# Patient Record
Sex: MALE | Race: WHITE | NOT HISPANIC OR LATINO
[De-identification: names, ages, dates, MRNs, and addresses within clinical notes are randomized per-mention and may not be internally consistent; named-entity substitution may affect disease eponyms.]

---

## 2022-02-16 ENCOUNTER — APPOINTMENT (OUTPATIENT)
Dept: CARDIOLOGY | Facility: CLINIC | Age: 72
End: 2022-02-16
Payer: COMMERCIAL

## 2022-02-16 ENCOUNTER — NON-APPOINTMENT (OUTPATIENT)
Age: 72
End: 2022-02-16

## 2022-02-16 VITALS
HEIGHT: 68 IN | RESPIRATION RATE: 17 BRPM | BODY MASS INDEX: 35.77 KG/M2 | SYSTOLIC BLOOD PRESSURE: 139 MMHG | OXYGEN SATURATION: 100 % | HEART RATE: 68 BPM | DIASTOLIC BLOOD PRESSURE: 87 MMHG | WEIGHT: 236 LBS

## 2022-02-16 PROCEDURE — 93000 ELECTROCARDIOGRAM COMPLETE: CPT

## 2022-02-16 PROCEDURE — 99204 OFFICE O/P NEW MOD 45 MIN: CPT

## 2022-02-16 RX ORDER — FINASTERIDE 5 MG/1
5 TABLET, FILM COATED ORAL
Refills: 0 | Status: ACTIVE | COMMUNITY

## 2022-02-16 RX ORDER — TELMISARTAN 80 MG/1
80 TABLET ORAL
Refills: 0 | Status: ACTIVE | COMMUNITY

## 2022-02-16 RX ORDER — VIT C/ZN GLUC/HERBAL NO.325 90 MG-15MG
LOZENGE MUCOUS MEMBRANE
Refills: 0 | Status: ACTIVE | COMMUNITY

## 2022-02-16 RX ORDER — PRAVASTATIN SODIUM 40 MG/1
40 TABLET ORAL
Refills: 0 | Status: ACTIVE | COMMUNITY

## 2022-02-16 RX ORDER — BUDESONIDE AND FORMOTEROL FUMARATE DIHYDRATE 160; 4.5 UG/1; UG/1
160-4.5 AEROSOL RESPIRATORY (INHALATION)
Refills: 0 | Status: ACTIVE | COMMUNITY

## 2022-02-16 RX ORDER — ICOSAPENT ETHYL 1000 MG/1
1 CAPSULE ORAL
Refills: 0 | Status: ACTIVE

## 2022-02-16 RX ORDER — MONTELUKAST 10 MG/1
10 TABLET, FILM COATED ORAL
Refills: 0 | Status: ACTIVE | COMMUNITY

## 2022-02-16 RX ORDER — PANTOPRAZOLE 20 MG/1
20 TABLET, DELAYED RELEASE ORAL
Refills: 0 | Status: ACTIVE | COMMUNITY

## 2022-02-16 RX ORDER — ATENOLOL 50 MG/1
50 TABLET ORAL
Refills: 0 | Status: ACTIVE | COMMUNITY

## 2022-02-16 RX ORDER — MULTIVIT-MIN/FA/LYCOPEN/LUTEIN .4-300-25
TABLET ORAL
Refills: 0 | Status: ACTIVE | COMMUNITY

## 2022-02-16 RX ORDER — TAMSULOSIN HYDROCHLORIDE 0.4 MG/1
0.4 CAPSULE ORAL
Refills: 0 | Status: ACTIVE | COMMUNITY

## 2022-02-16 RX ORDER — MULTIVIT-MIN/IRON/FOLIC ACID/K 18-600-40
CAPSULE ORAL
Refills: 0 | Status: ACTIVE | COMMUNITY

## 2022-02-16 RX ORDER — CHROMIUM 200 MCG
TABLET ORAL
Refills: 0 | Status: ACTIVE | COMMUNITY

## 2022-02-16 RX ORDER — VITAMIN B COMPLEX
CAPSULE ORAL
Refills: 0 | Status: ACTIVE | COMMUNITY

## 2022-02-16 NOTE — ASSESSMENT
[FreeTextEntry1] : Gil Umaña 71 years old who I have  not seen for many years has hypertension well controlled hyperlipidemia well controlled and diabetes now well controlled.  He has a normal EKG and normal physical exam except for abdominal obesity.  He has made significant progress in losing weight and again his diabetes is, the better control with his hemoglobin A1c now 5.6.\par \par He has no active angina, his EKG is normal.  His last catheterization in 2010 was normal with normal left ventricular function and no significant coronary disease\par \par Presently he is functioning at a good level and tolerating his present regimen medications and his blood pressure is under good control\par

## 2022-02-16 NOTE — DISCUSSION/SUMMARY
[FreeTextEntry1] : 1.  Follow-up with me again in 1 month and I have requested my old records\par 2.  Continue present regimen of medications\par 3.  When he follows up in 1 month 2D echo to evaluate LV and RV function and any valvular disease

## 2022-02-16 NOTE — HISTORY OF PRESENT ILLNESS
[FreeTextEntry1] : Gil has a history of hypertension hyperlipidemia and diabetes type 2.  He has abdominal obesity.  Many years ago he underwent cardiac catheterization and either had a PTCA balloon angioplasty or stent.\par \par He had a repeat cardiac catheterization September 13, 2010 which revealed normal left ventricular function no significant aortic insufficiency or mitral insufficiency the coronaries had no significant disease left main was normal LAD mild irregularities circumflex mild irregularity in the proximal right coronary artery showed minor irregularity..\par \par Recently apparently his diabetes has not been under good control but his diabetologist is change his medications with improvement in his hemoglobin A1c\par \par He denies chest pain chest pressure or shortness of breath.\par \par He has significant benign prostatic hypertrophy and has been recommended to have a urologic procedure\par He has lost a significant amount of weight.  Overall he feels well\par \par Blood tests dated 1/24/2022 iron 48 ferritin 20.8\par WBC 10.0 hemoglobin 14.4 hematocrit 43.2\par Sodium 138 potassium 4.6 chloride 99 carbon dioxide 25 glucose 85 BUN 16 creatinine 0.7 normal liver function tests\par Lipid panel cholesterol 130 triglycerides 134 HDL 53 LDL 50\par Hemoglobin A1c 5.6 markedly improved homocysteine 7.9\par Normal thyroid function\par \par EKG today normal sinus rhythm within normal limits\par \par

## 2022-02-16 NOTE — REASON FOR VISIT
[FreeTextEntry1] : Gil Umaña  71 years old here for evaluation of his cardiac status.  This is a patient I have followed for many years but have not seen for at least 4 years

## 2022-02-16 NOTE — PHYSICAL EXAM
[Normal Conjunctiva] : normal conjunctiva [Normal Venous Pressure] : normal venous pressure [No Carotid Bruit] : no carotid bruit [Normal S1, S2] : normal S1, S2 [No Murmur] : no murmur [Soft] : abdomen soft [Non Tender] : non-tender [No Edema] : no edema [Moves all extremities] : moves all extremities [Alert and Oriented] : alert and oriented [de-identified] : Overweight pleasant no acute distress [de-identified] : Protuberant no bruit

## 2022-03-16 ENCOUNTER — APPOINTMENT (OUTPATIENT)
Dept: CARDIOLOGY | Facility: CLINIC | Age: 72
End: 2022-03-16
Payer: MEDICARE

## 2022-03-16 ENCOUNTER — NON-APPOINTMENT (OUTPATIENT)
Age: 72
End: 2022-03-16

## 2022-03-16 VITALS
BODY MASS INDEX: 35.58 KG/M2 | HEART RATE: 66 BPM | WEIGHT: 234 LBS | SYSTOLIC BLOOD PRESSURE: 112 MMHG | DIASTOLIC BLOOD PRESSURE: 84 MMHG | OXYGEN SATURATION: 98 %

## 2022-03-16 PROCEDURE — 99214 OFFICE O/P EST MOD 30 MIN: CPT

## 2022-03-16 PROCEDURE — 93000 ELECTROCARDIOGRAM COMPLETE: CPT

## 2022-03-16 PROCEDURE — 93306 TTE W/DOPPLER COMPLETE: CPT

## 2022-03-16 PROCEDURE — 99214 OFFICE O/P EST MOD 30 MIN: CPT | Mod: 25

## 2022-03-16 NOTE — HISTORY OF PRESENT ILLNESS
[FreeTextEntry1] : Gil has a history of hypertension hyperlipidemia and diabetes type 2.  He has abdominal obesity.  Many years ago he underwent cardiac catheterization and either had a PTCA balloon angioplasty or stent.\par \par He had a repeat cardiac catheterization September 13, 2010 which revealed normal left ventricular function no significant aortic insufficiency or mitral insufficiency the coronaries had no significant disease left main was normal LAD mild irregularities circumflex mild irregularity in the proximal right coronary artery showed minor irregularity..\par \par Recently apparently his diabetes has not been under good control but his diabetologist is change his medications with improvement in his hemoglobin A1c\par \par He denies chest pain chest pressure or shortness of breath.\par \par He has significant benign prostatic hypertrophy and has been recommended to have a urologic procedure\par He has lost a significant amount of weight.  Overall he feels well\par \par Blood tests dated 1/24/2022 iron 48 ferritin 20.8\par WBC 10.0 hemoglobin 14.4 hematocrit 43.2\par Sodium 138 potassium 4.6 chloride 99 carbon dioxide 25 glucose 85 BUN 16 creatinine 0.7 normal liver function tests\par Lipid panel cholesterol 130 triglycerides 134 HDL 53 LDL 50\par Hemoglobin A1c 5.6 markedly improved homocysteine 7.9\par Normal thyroid function\par \par EKG February 16, 2022 normal sinus rhythm within normal limits\par \par 2D echo performed today preliminarily reveals normal left ventricular and right ventricular function no significant valvular disease\par \par Patient overall feels well has been on a strict diet and continue to lose weight.  He has no chest pain chest pressure shortness of breath edema orthopnea PND\par \par EKG March 16, 2022 normal sinus rhythm within normal limits\par \par

## 2022-03-16 NOTE — ASSESSMENT
[FreeTextEntry1] : Gil Umaña 71 years old who I have  not seen for many years has hypertension well controlled hyperlipidemia well controlled and diabetes now well controlled.  He has a normal EKG and normal physical exam except for abdominal obesity.  He has made significant progress in losing weight and again his diabetes is, the better control with his hemoglobin A1c now 5.6.  2D echo performed today preliminarily reveals normal LV and RV function and no significant valvular disease\par \par He has no active angina, his EKG is normal.  His last catheterization in 2010 was normal with normal left ventricular function and no significant coronary disease\par \par Presently he is functioning at a good level and tolerating his present regimen medications and his blood pressure is under good control\par \par He continues to lose weight and to be as active as he possibly can.\par He still has significant abdominal obesity

## 2022-03-16 NOTE — PHYSICAL EXAM
[Normal Conjunctiva] : normal conjunctiva [Normal Venous Pressure] : normal venous pressure [No Carotid Bruit] : no carotid bruit [Normal S1, S2] : normal S1, S2 [No Murmur] : no murmur [Soft] : abdomen soft [Non Tender] : non-tender [No Edema] : no edema [Moves all extremities] : moves all extremities [Alert and Oriented] : alert and oriented [de-identified] : Overweight pleasant no acute distress [de-identified] : Protuberant no bruit

## 2022-07-13 ENCOUNTER — APPOINTMENT (OUTPATIENT)
Dept: CARDIOLOGY | Facility: CLINIC | Age: 72
End: 2022-07-13

## 2022-07-13 VITALS
SYSTOLIC BLOOD PRESSURE: 120 MMHG | BODY MASS INDEX: 36.49 KG/M2 | OXYGEN SATURATION: 97 % | DIASTOLIC BLOOD PRESSURE: 70 MMHG | HEART RATE: 63 BPM | WEIGHT: 240 LBS

## 2022-07-13 DIAGNOSIS — Z95.5 PRESENCE OF CORONARY ANGIOPLASTY IMPLANT AND GRAFT: ICD-10-CM

## 2022-07-13 PROCEDURE — 99214 OFFICE O/P EST MOD 30 MIN: CPT

## 2022-07-13 RX ORDER — ALBUTEROL SULFATE 90 UG/1
108 (90 BASE) INHALANT RESPIRATORY (INHALATION)
Qty: 18 | Refills: 0 | Status: ACTIVE | COMMUNITY
Start: 2022-02-12

## 2022-07-13 NOTE — HISTORY OF PRESENT ILLNESS
[FreeTextEntry1] : Gil has a history of hypertension hyperlipidemia and diabetes type 2.  He has abdominal obesity.  Many years ago he underwent cardiac catheterization and either had a PTCA balloon angioplasty or stent.\par \par He had a repeat cardiac catheterization September 13, 2010 which revealed normal left ventricular function no significant aortic insufficiency or mitral insufficiency the coronaries had no significant disease left main was normal LAD mild irregularities circumflex mild irregularity in the proximal right coronary artery showed minor irregularity..\par \par Recently apparently his diabetes has not been under good control but his diabetologist is change his medications with improvement in his hemoglobin A1c\par \par He denies chest pain chest pressure or shortness of breath.\par \par He has significant benign prostatic hypertrophy and has been recommended to have a urologic procedure\par He has lost a significant amount of weight.  Overall he feels well\par \par Blood tests dated 1/24/2022 iron 48 ferritin 20.8\par WBC 10.0 hemoglobin 14.4 hematocrit 43.2\par Sodium 138 potassium 4.6 chloride 99 carbon dioxide 25 glucose 85 BUN 16 creatinine 0.7 normal liver function tests\par Lipid panel cholesterol 130 triglycerides 134 HDL 53 LDL 50\par Hemoglobin A1c 5.6 markedly improved homocysteine 7.9\par Normal thyroid function\par \par EKG February 16, 2022 normal sinus rhythm within normal limits\par \par 2D echo performed   March 16, 2022 reveals normal left ventricular and right ventricular function no significant valvular disease and no pulm hypertension\par \par Patient overall feels well has been on a strict diet and continue to lose weight.  He has no chest pain chest pressure shortness of breath edema orthopnea PND\par \par EKG March 16, 2022 normal sinus rhythm within normal limits\par \par Patient had blood tests at [facility] 2022\par  total cholesterol 131 HDL 49 LDL 59 triglycerides 119 glucose 95\par \par  he is under a lot of stress at work but otherwise he offers no complaints.  As a result of the stress he screened about 5 to 6 pounds.  He is not taking Ozempic at the present time which may be helpful in his weight loss\par \par

## 2022-07-13 NOTE — DISCUSSION/SUMMARY
[FreeTextEntry1] :  patient will continue on his present regimen of medication.\par \par   Routine follow with me again in 3 to 4 months.\par \par Addendum:\par  The wife related to me which he did not mention that he has been having vertigo-like symptoms and he was seen by ENT and is scheduled for an MRI.  It is probably vestibular and not stent trial but this needs complete evaluation.  He will follow-up again in 3 months after is completed his evaluation\par \par  Jah Scales MD, FACC

## 2022-07-13 NOTE — REASON FOR VISIT
[FreeTextEntry1] : Gil Umaña  71 years old here for evaluation of his cardiac status.  This is a patient I have followed for many years but have not seen for at least 4 years. This is his third visit over the last 6 months.

## 2022-07-13 NOTE — PHYSICAL EXAM
[Normal Conjunctiva] : normal conjunctiva [Normal Venous Pressure] : normal venous pressure [No Carotid Bruit] : no carotid bruit [Normal S1, S2] : normal S1, S2 [No Murmur] : no murmur [Soft] : abdomen soft [Non Tender] : non-tender [No Edema] : no edema [Moves all extremities] : moves all extremities [Alert and Oriented] : alert and oriented [de-identified] : Overweight pleasant no acute distress [de-identified] : Protuberant no bruit

## 2022-07-13 NOTE — ASSESSMENT
[FreeTextEntry1] : Gil Umaña 71 years old who I have  not seen for many years has hypertension well controlled hyperlipidemia well controlled and diabetes now well controlled.  He has a normal EKG and normal physical exam except for abdominal obesity.  He has made significant progress in losing weight and again his diabetes is, the better control with his hemoglobin A1c now 5.6.  recent 2D echo was unremarkable with normal LV and RV function and no valvular disease\par \par He has no active angina, his EKG is normal.  His last catheterization in 2010 was normal with normal left ventricular function and no significant coronary disease\par \par Presently he is functioning at a good level and tolerating his present regimen medications and his blood pressure is under good control\par \par  there are no active cardiac issues at the present time.  He needs to follow-up with diabetology and to try to decrease his stress at work.\par \par   At this point no further cardiac work-up is needed

## 2022-07-21 ENCOUNTER — APPOINTMENT (OUTPATIENT)
Dept: OPHTHALMOLOGY | Facility: CLINIC | Age: 72
End: 2022-07-21

## 2022-07-21 ENCOUNTER — NON-APPOINTMENT (OUTPATIENT)
Age: 72
End: 2022-07-21

## 2022-07-21 PROCEDURE — 92136 OPHTHALMIC BIOMETRY: CPT

## 2022-07-21 PROCEDURE — 92250 FUNDUS PHOTOGRAPHY W/I&R: CPT

## 2022-07-21 PROCEDURE — 92004 COMPRE OPH EXAM NEW PT 1/>: CPT

## 2022-08-22 ENCOUNTER — APPOINTMENT (OUTPATIENT)
Dept: OPHTHALMOLOGY | Facility: AMBULATORY MEDICAL SERVICES | Age: 72
End: 2022-08-22

## 2022-08-22 PROCEDURE — 66984 XCAPSL CTRC RMVL W/O ECP: CPT | Mod: LT

## 2022-08-22 PROCEDURE — 6698F: CPT

## 2022-08-23 ENCOUNTER — NON-APPOINTMENT (OUTPATIENT)
Age: 72
End: 2022-08-23

## 2022-08-23 ENCOUNTER — APPOINTMENT (OUTPATIENT)
Dept: OPHTHALMOLOGY | Facility: CLINIC | Age: 72
End: 2022-08-23

## 2022-08-23 PROCEDURE — 99024 POSTOP FOLLOW-UP VISIT: CPT

## 2022-08-30 ENCOUNTER — APPOINTMENT (OUTPATIENT)
Dept: OPHTHALMOLOGY | Facility: CLINIC | Age: 72
End: 2022-08-30

## 2022-08-30 ENCOUNTER — NON-APPOINTMENT (OUTPATIENT)
Age: 72
End: 2022-08-30

## 2022-08-30 PROCEDURE — 99024 POSTOP FOLLOW-UP VISIT: CPT

## 2022-09-20 ENCOUNTER — NON-APPOINTMENT (OUTPATIENT)
Age: 72
End: 2022-09-20

## 2022-09-20 ENCOUNTER — APPOINTMENT (OUTPATIENT)
Dept: OPHTHALMOLOGY | Facility: CLINIC | Age: 72
End: 2022-09-20

## 2022-09-20 PROCEDURE — 99024 POSTOP FOLLOW-UP VISIT: CPT

## 2022-09-20 PROCEDURE — 92136 OPHTHALMIC BIOMETRY: CPT | Mod: 26,RT

## 2022-09-20 PROCEDURE — 92250 FUNDUS PHOTOGRAPHY W/I&R: CPT

## 2022-11-04 ENCOUNTER — APPOINTMENT (OUTPATIENT)
Dept: CARDIOLOGY | Facility: CLINIC | Age: 72
End: 2022-11-04

## 2022-11-04 ENCOUNTER — NON-APPOINTMENT (OUTPATIENT)
Age: 72
End: 2022-11-04

## 2022-11-04 VITALS
SYSTOLIC BLOOD PRESSURE: 142 MMHG | HEIGHT: 68 IN | OXYGEN SATURATION: 98 % | BODY MASS INDEX: 36.37 KG/M2 | HEART RATE: 54 BPM | DIASTOLIC BLOOD PRESSURE: 70 MMHG | WEIGHT: 240 LBS

## 2022-11-04 PROCEDURE — 93000 ELECTROCARDIOGRAM COMPLETE: CPT

## 2022-11-04 PROCEDURE — 99214 OFFICE O/P EST MOD 30 MIN: CPT | Mod: 25

## 2022-11-04 RX ORDER — SEMAGLUTIDE 1.34 MG/ML
4 INJECTION, SOLUTION SUBCUTANEOUS
Qty: 3 | Refills: 0 | Status: DISCONTINUED | COMMUNITY
Start: 2022-02-18 | End: 2022-11-04

## 2022-11-04 RX ORDER — POLYMYXIN B SULFATE AND TRIMETHOPRIM 10000; 1 [USP'U]/ML; MG/ML
10000-0.1 SOLUTION OPHTHALMIC
Qty: 10 | Refills: 0 | Status: DISCONTINUED | COMMUNITY
Start: 2022-03-22 | End: 2022-11-04

## 2022-11-04 RX ORDER — SEMAGLUTIDE 1.34 MG/ML
2 INJECTION, SOLUTION SUBCUTANEOUS
Refills: 0 | Status: DISCONTINUED | COMMUNITY
End: 2022-11-04

## 2022-11-04 RX ORDER — AZELASTINE HYDROCHLORIDE 205.5 UG/1
0.15 SPRAY, METERED NASAL
Qty: 30 | Refills: 0 | Status: DISCONTINUED | COMMUNITY
Start: 2022-06-23 | End: 2022-11-04

## 2022-11-04 RX ORDER — KETOCONAZOLE 20 MG/G
2 CREAM TOPICAL
Qty: 60 | Refills: 0 | Status: ACTIVE | COMMUNITY
Start: 2022-10-15

## 2022-11-04 NOTE — PHYSICAL EXAM
[Normal Conjunctiva] : normal conjunctiva [Normal Venous Pressure] : normal venous pressure [No Carotid Bruit] : no carotid bruit [Normal S1, S2] : normal S1, S2 [No Murmur] : no murmur [Soft] : abdomen soft [Non Tender] : non-tender [No Edema] : no edema [Moves all extremities] : moves all extremities [Alert and Oriented] : alert and oriented [de-identified] : Overweight pleasant no acute distress [de-identified] : Protuberant no bruit

## 2022-11-04 NOTE — REASON FOR VISIT
[FreeTextEntry1] : Gil Umaña 72 years old was seen in routine cardiac follow-up.  I last saw him in July 2022

## 2022-11-04 NOTE — ASSESSMENT
[FreeTextEntry1] : Gil Umaña 71 years old who I have  not seen for many years has hypertension well controlled hyperlipidemia well controlled and diabetes now well controlled.  He has a normal EKG and normal physical exam except for abdominal obesity.  He has made significant progress in losing weight and again his diabetes is, the better control with his hemoglobin A1c now 5.6.  recent 2D echo was unremarkable with normal LV and RV function and no valvular disease\par \par He has no active angina, his EKG is normal.  His last catheterization in 2010 was normal with normal left ventricular function and no significant coronary disease\par \par Presently he is functioning at a good level and tolerating his present regimen medications and his blood pressure is under good control.\par \par  there are no active cardiac issues at the present time. \par \par \par 1.  History of coronary artery disease no active issues, recent normal echo, no angina, normal EKG\par  2.  Hypertension well-controlled\par  3.  Elevated BMI\par  4.  Diabetes type 2 still not under ideal control but certainly reasonably well controlled being followed carefully by diabetology\par \par

## 2022-11-04 NOTE — HISTORY OF PRESENT ILLNESS
[FreeTextEntry1] : Gil has a history of hypertension hyperlipidemia and diabetes type 2.  He has abdominal obesity.  Many years ago he underwent cardiac catheterization and either had a PTCA balloon angioplasty or stent.\par \par He had a repeat cardiac catheterization September 13, 2010 which revealed normal left ventricular function no significant aortic insufficiency or mitral insufficiency the coronaries had no significant disease left main was normal LAD mild irregularities circumflex mild irregularity in the proximal right coronary artery showed minor irregularity..\par \par Recently apparently his diabetes has not been under good control but his diabetologist has  change his medications with improvement in his hemoglobin A1c\par \par He denies chest pain chest pressure or shortness of breath.\par \par He has significant benign prostatic hypertrophy and has been recommended to have a urologic procedure\par He has lost a significant amount of weight.  Overall he feels well\par \par Blood tests dated 1/24/2022 iron 48 ferritin 20.8\par WBC 10.0 hemoglobin 14.4 hematocrit 43.2\par Sodium 138 potassium 4.6 chloride 99 carbon dioxide 25 glucose 85 BUN 16 creatinine 0.7 normal liver function tests\par Lipid panel cholesterol 130 triglycerides 134 HDL 53 LDL 50\par Hemoglobin A1c 5.6 markedly improved homocysteine 7.9\par Normal thyroid function\par \par EKG February 16, 2022 normal sinus rhythm within normal limits\par \par 2D echo performed   March 16, 2022 reveals normal left ventricular and right ventricular function no significant valvular disease and no pulm hypertension\par \par Patient overall feels well has been on a strict diet and continue to lose weight.  He has no chest pain chest pressure shortness of breath edema orthopnea PND\par \par EKG March 16, 2022 normal sinus rhythm within normal limits\par \par Patient had blood tests at [facility] June 2022\par  total cholesterol 131 HDL 49 LDL 59 triglycerides 119 glucose 95\par \par  he is under a lot of stress at work but otherwise he offers no complaints.  As a result of the stress he screened about 5 to 6 pounds.  He is not taking Ozempic at the present time which may be helpful in his weight loss\par \par  visit November 4, 2022: Patient feels well without chest pain chest pressure shortness of breath. the patient has gained some weight since last visit and his hemoglobin A1c has increased to 6.2.  He has not been so careful with his diet.\par \par   Recent blood work dated 10/23/2022:\par  Hemoglobin A1c 6.2\par  normal liver function tests creatinine 1.99 BUN 22 potassium 4.9\par \par   EKG November 4, 2022: Normal sinus rhythm within normal limits no change\par  lipid panel cholesterol 152 HDL 58 triglycerides 153 LDL 63 cholesterol/HDL 3\par  WBC 11.56 hemoglobin 14.6 hematocrit 48.2

## 2022-11-04 NOTE — DISCUSSION/SUMMARY
[FreeTextEntry1] :  patient will continue on his present regimen of medications.  He will be more careful with the salt in his diet as well as carbohydrates and sugars and make greater effort to lose weight.  I told him to discuss with his diabetologist restarting Ozempic as this may help with his weight reduction and improve his diabetes.\par \par   Routine follow with me in 3 months. [EKG obtained to assist in diagnosis and management of assessed problem(s)] : EKG obtained to assist in diagnosis and management of assessed problem(s)

## 2022-11-28 ENCOUNTER — APPOINTMENT (OUTPATIENT)
Dept: OPHTHALMOLOGY | Facility: AMBULATORY MEDICAL SERVICES | Age: 72
End: 2022-11-28

## 2022-11-29 ENCOUNTER — APPOINTMENT (OUTPATIENT)
Dept: OPHTHALMOLOGY | Facility: CLINIC | Age: 72
End: 2022-11-29

## 2022-12-06 ENCOUNTER — APPOINTMENT (OUTPATIENT)
Dept: OPHTHALMOLOGY | Facility: CLINIC | Age: 72
End: 2022-12-06

## 2022-12-27 ENCOUNTER — APPOINTMENT (OUTPATIENT)
Dept: OPHTHALMOLOGY | Facility: CLINIC | Age: 72
End: 2022-12-27

## 2023-01-09 ENCOUNTER — APPOINTMENT (OUTPATIENT)
Dept: OPHTHALMOLOGY | Facility: AMBULATORY SURGERY CENTER | Age: 73
End: 2023-01-09

## 2023-01-10 ENCOUNTER — APPOINTMENT (OUTPATIENT)
Dept: OPHTHALMOLOGY | Facility: CLINIC | Age: 73
End: 2023-01-10

## 2023-01-17 ENCOUNTER — APPOINTMENT (OUTPATIENT)
Dept: OPHTHALMOLOGY | Facility: CLINIC | Age: 73
End: 2023-01-17

## 2023-02-03 ENCOUNTER — APPOINTMENT (OUTPATIENT)
Dept: CARDIOLOGY | Facility: CLINIC | Age: 73
End: 2023-02-03

## 2023-02-14 ENCOUNTER — APPOINTMENT (OUTPATIENT)
Dept: OPHTHALMOLOGY | Facility: CLINIC | Age: 73
End: 2023-02-14

## 2023-03-01 ENCOUNTER — APPOINTMENT (OUTPATIENT)
Dept: CARDIOLOGY | Facility: CLINIC | Age: 73
End: 2023-03-01
Payer: MEDICARE

## 2023-03-01 VITALS
DIASTOLIC BLOOD PRESSURE: 90 MMHG | HEART RATE: 57 BPM | SYSTOLIC BLOOD PRESSURE: 168 MMHG | OXYGEN SATURATION: 98 % | BODY MASS INDEX: 39.08 KG/M2 | WEIGHT: 257 LBS

## 2023-03-01 PROCEDURE — 99214 OFFICE O/P EST MOD 30 MIN: CPT

## 2023-03-01 NOTE — HISTORY OF PRESENT ILLNESS
[FreeTextEntry1] : Gil has a history of hypertension hyperlipidemia and diabetes type 2.  He has abdominal obesity.  Many years ago he underwent cardiac catheterization and either had a PTCA balloon angioplasty or stent.\par \par He had a repeat cardiac catheterization September 13, 2010 which revealed normal left ventricular function no significant aortic insufficiency or mitral insufficiency the coronaries had no significant disease left main was normal LAD mild irregularities circumflex mild irregularity in the proximal right coronary artery showed minor irregularity..\par \par Recently apparently his diabetes has not been under good control but his diabetologist has  change his medications with improvement in his hemoglobin A1c\par \par He denies chest pain chest pressure or shortness of breath.\par \par He has significant benign prostatic hypertrophy and has been recommended to have a urologic procedure\par He has lost a significant amount of weight.  Overall he feels well\par \par Blood tests dated 1/24/2022 iron 48 ferritin 20.8\par WBC 10.0 hemoglobin 14.4 hematocrit 43.2\par Sodium 138 potassium 4.6 chloride 99 carbon dioxide 25 glucose 85 BUN 16 creatinine 0.7 normal liver function tests\par Lipid panel cholesterol 130 triglycerides 134 HDL 53 LDL 50\par Hemoglobin A1c 5.6 markedly improved homocysteine 7.9\par Normal thyroid function\par \par EKG February 16, 2022 normal sinus rhythm within normal limits\par \par 2D echo performed   March 16, 2022 reveals normal left ventricular and right ventricular function no significant valvular disease and no pulm hypertension\par \par Patient overall feels well has been on a strict diet and continue to lose weight.  He has no chest pain chest pressure shortness of breath edema orthopnea PND\par \par EKG March 16, 2022 normal sinus rhythm within normal limits\par \par Patient had blood tests at [facility] June 2022\par  total cholesterol 131 HDL 49 LDL 59 triglycerides 119 glucose 95\par \par  he is under a lot of stress at work but otherwise he offers no complaints.  As a result of the stress he screened about 5 to 6 pounds.  He is not taking Ozempic at the present time which may be helpful in his weight loss\par \par  visit November 4, 2022: Patient feels well without chest pain chest pressure shortness of breath. the patient has gained some weight since last visit and his hemoglobin A1c has increased to 6.2.  He has not been so careful with his diet.\par \par   Recent blood work dated 10/23/2022:\par  Hemoglobin A1c 6.2\par  normal liver function tests creatinine 1.99 BUN 22 potassium 4.9\par \par   EKG November 4, 2022: Normal sinus rhythm within normal limits no change\par  lipid panel cholesterol 152 HDL 58 triglycerides 153 LDL 63 cholesterol/HDL 3\par  WBC 11.56 hemoglobin 14.6 hematocrit 48.2\par \par Visit February 29, 2023: Patient called on the weekend that he was experiencing some tightness in his chest.  It was not exertionally related.  It did not sound cardiac.  However he went to urgent care where an EKG which was sent to me was within normal limits.  He now returns for follow-up.\par \par He is complaining of a variety of symptoms.  He continues to have some nonspecific discomfort in his chest diffuse achiness and again some sticking pain in his chest.  He has gained 20 pounds and has been off all his diabetic meds but has recently restarted after seeing his diabetologist.

## 2023-03-01 NOTE — DISCUSSION/SUMMARY
[FreeTextEntry1] : 1.  I reassured him that I did not feel there were any active cardiac issues.\par 2.  I told him that he needs to really take hold of his weight diet and go back on his diabetic medicines\par 3.  Follow-up with me again in 2 to 3 months.  If symptoms persist will do a noninvasive cardiac work-up

## 2023-03-01 NOTE — ASSESSMENT
[FreeTextEntry1] : Gil Umaña 71 years old who I have  not seen for many years has hypertension well controlled hyperlipidemia well controlled and diabetes now well controlled.  He has a normal EKG and normal physical exam except for abdominal obesity.  He has made significant progress in losing weight and again his diabetes is, the better control with his hemoglobin A1c now 5.6.  recent 2D echo was unremarkable with normal LV and RV function and no valvular disease\par \par He has no active angina, his EKG is normal.  His last catheterization in 2010 was normal with normal left ventricular function and no significant coronary disease\par \par Presently he is functioning at a good level and tolerating his present regimen medications and his blood pressure is under good control.\par The patient recently has had a variety of nonspecific complaints of abdominal discomfort weight gain some tightness in his chest which are probably noncardiac.  His physical exam is normal except that he has gained a tremendous amount of weight\par  there are no active cardiac issues at the present time. \par \par \par 1.  History of coronary artery disease no active issues, recent normal echo, no angina, normal EKG\par  2.  Hypertension well-controlled\par  3.  Elevated BMI\par  4.  Diabetes type 2 still not under ideal control but certainly reasonably well controlled being followed carefully by diabetology.  He recently stopped his diabetic medicines but now has restarted them\par \par 5.  Weight gain\par 6.  Nonspecific atypical chest pain abdominal pain nonspecific weakness\par \par

## 2023-03-01 NOTE — PHYSICAL EXAM
[Normal Conjunctiva] : normal conjunctiva [Normal Venous Pressure] : normal venous pressure [No Carotid Bruit] : no carotid bruit [Normal S1, S2] : normal S1, S2 [No Murmur] : no murmur [Soft] : abdomen soft [Non Tender] : non-tender [No Edema] : no edema [Moves all extremities] : moves all extremities [Alert and Oriented] : alert and oriented [de-identified] : Overweight pleasant no acute distress [de-identified] : Protuberant no bruit

## 2023-03-01 NOTE — REASON FOR VISIT
[FreeTextEntry1] : Gil Umaña 72 years old here for follow-up of his cardiac status.  He called on the weekend that he was having some vague tightness went to urgent care where EKG was within normal limits.  I reassured him and told to make a follow-up appointment which he did today on February 29, 2023

## 2023-04-04 ENCOUNTER — NON-APPOINTMENT (OUTPATIENT)
Age: 73
End: 2023-04-04

## 2023-04-04 ENCOUNTER — APPOINTMENT (OUTPATIENT)
Dept: OPHTHALMOLOGY | Facility: CLINIC | Age: 73
End: 2023-04-04
Payer: MEDICARE

## 2023-04-04 PROCEDURE — 92012 INTRM OPH EXAM EST PATIENT: CPT

## 2023-04-04 PROCEDURE — 92025 CPTRIZED CORNEAL TOPOGRAPHY: CPT

## 2023-04-04 PROCEDURE — 92136 OPHTHALMIC BIOMETRY: CPT

## 2023-04-28 ENCOUNTER — APPOINTMENT (OUTPATIENT)
Dept: CARDIOLOGY | Facility: CLINIC | Age: 73
End: 2023-04-28

## 2023-06-26 ENCOUNTER — APPOINTMENT (OUTPATIENT)
Dept: OPHTHALMOLOGY | Facility: AMBULATORY MEDICAL SERVICES | Age: 73
End: 2023-06-26
Payer: MEDICARE

## 2023-06-26 PROCEDURE — 6698F: CPT

## 2023-06-26 PROCEDURE — 66984 XCAPSL CTRC RMVL W/O ECP: CPT | Mod: RT

## 2023-06-27 ENCOUNTER — APPOINTMENT (OUTPATIENT)
Dept: OPHTHALMOLOGY | Facility: CLINIC | Age: 73
End: 2023-06-27
Payer: MEDICARE

## 2023-06-27 ENCOUNTER — NON-APPOINTMENT (OUTPATIENT)
Age: 73
End: 2023-06-27

## 2023-06-27 PROCEDURE — 99024 POSTOP FOLLOW-UP VISIT: CPT

## 2023-07-06 ENCOUNTER — NON-APPOINTMENT (OUTPATIENT)
Age: 73
End: 2023-07-06

## 2023-07-06 ENCOUNTER — APPOINTMENT (OUTPATIENT)
Dept: OPHTHALMOLOGY | Facility: CLINIC | Age: 73
End: 2023-07-06
Payer: MEDICARE

## 2023-07-06 PROCEDURE — 99024 POSTOP FOLLOW-UP VISIT: CPT

## 2023-07-25 ENCOUNTER — APPOINTMENT (OUTPATIENT)
Dept: OPHTHALMOLOGY | Facility: CLINIC | Age: 73
End: 2023-07-25
Payer: MEDICARE

## 2023-07-25 ENCOUNTER — NON-APPOINTMENT (OUTPATIENT)
Age: 73
End: 2023-07-25

## 2023-07-25 PROCEDURE — 99024 POSTOP FOLLOW-UP VISIT: CPT

## 2023-10-06 ENCOUNTER — APPOINTMENT (OUTPATIENT)
Dept: CARDIOLOGY | Facility: CLINIC | Age: 73
End: 2023-10-06
Payer: MEDICARE

## 2023-10-06 ENCOUNTER — NON-APPOINTMENT (OUTPATIENT)
Age: 73
End: 2023-10-06

## 2023-10-06 VITALS
HEART RATE: 69 BPM | DIASTOLIC BLOOD PRESSURE: 85 MMHG | OXYGEN SATURATION: 100 % | BODY MASS INDEX: 39.53 KG/M2 | WEIGHT: 260 LBS | SYSTOLIC BLOOD PRESSURE: 129 MMHG

## 2023-10-06 PROCEDURE — 93000 ELECTROCARDIOGRAM COMPLETE: CPT

## 2023-10-06 PROCEDURE — 99214 OFFICE O/P EST MOD 30 MIN: CPT | Mod: 25

## 2023-10-10 ENCOUNTER — APPOINTMENT (OUTPATIENT)
Dept: OPHTHALMOLOGY | Facility: CLINIC | Age: 73
End: 2023-10-10
Payer: MEDICARE

## 2023-10-10 ENCOUNTER — NON-APPOINTMENT (OUTPATIENT)
Age: 73
End: 2023-10-10

## 2023-10-10 PROCEDURE — 92012 INTRM OPH EXAM EST PATIENT: CPT

## 2024-02-20 ENCOUNTER — NON-APPOINTMENT (OUTPATIENT)
Age: 74
End: 2024-02-20

## 2024-02-20 ENCOUNTER — APPOINTMENT (OUTPATIENT)
Dept: CARDIOLOGY | Facility: CLINIC | Age: 74
End: 2024-02-20
Payer: MEDICARE

## 2024-02-20 VITALS — BODY MASS INDEX: 39.38 KG/M2 | WEIGHT: 259 LBS

## 2024-02-20 VITALS — HEART RATE: 65 BPM | SYSTOLIC BLOOD PRESSURE: 152 MMHG | OXYGEN SATURATION: 100 % | DIASTOLIC BLOOD PRESSURE: 85 MMHG

## 2024-02-20 PROCEDURE — 99214 OFFICE O/P EST MOD 30 MIN: CPT

## 2024-02-20 PROCEDURE — G2211 COMPLEX E/M VISIT ADD ON: CPT

## 2024-02-20 PROCEDURE — 93000 ELECTROCARDIOGRAM COMPLETE: CPT

## 2024-02-20 RX ORDER — EMPAGLIFLOZIN AND METFORMIN HYDROCHLORIDE 12.5; 1 MG/1; MG/1
12.5-1 TABLET ORAL
Refills: 0 | Status: ACTIVE | COMMUNITY

## 2024-02-20 NOTE — PHYSICAL EXAM
[Normal Conjunctiva] : normal conjunctiva [Normal Venous Pressure] : normal venous pressure [No Carotid Bruit] : no carotid bruit [Normal S1, S2] : normal S1, S2 [No Murmur] : no murmur [Soft] : abdomen soft [Non Tender] : non-tender [No Edema] : no edema [Moves all extremities] : moves all extremities [Alert and Oriented] : alert and oriented [de-identified] : Overweight pleasant no acute distress [de-identified] : Protuberant no bruit

## 2024-02-20 NOTE — HISTORY OF PRESENT ILLNESS
[FreeTextEntry1] : Gil has a history of hypertension hyperlipidemia and diabetes type 2.  He has abdominal obesity.  Many years ago he underwent cardiac catheterization and either had a PTCA balloon angioplasty or stent.  He had a repeat cardiac catheterization September 13, 2010 which revealed normal left ventricular function no significant aortic insufficiency or mitral insufficiency the coronaries had no significant disease left main was normal LAD mild irregularities circumflex mild irregularity in the proximal right coronary artery showed minor irregularity..  Recently apparently his diabetes has not been under good control but his diabetologist has  change his medications with improvement in his hemoglobin A1c  He denies chest pain chest pressure or shortness of breath.  He has significant benign prostatic hypertrophy and has been recommended to have a urologic procedure He has lost a significant amount of weight.  Overall he feels well  Blood tests dated 1/24/2022 iron 48 ferritin 20.8 WBC 10.0 hemoglobin 14.4 hematocrit 43.2 Sodium 138 potassium 4.6 chloride 99 carbon dioxide 25 glucose 85 BUN 16 creatinine 0.7 normal liver function tests Lipid panel cholesterol 130 triglycerides 134 HDL 53 LDL 50 Hemoglobin A1c 5.6 markedly improved homocysteine 7.9 Normal thyroid function  EKG February 16, 2022 normal sinus rhythm within normal limits  2D echo performed   March 16, 2022 reveals normal left ventricular and right ventricular function no significant valvular disease and no pulm hypertension  Patient overall feels well has been on a strict diet and continue to lose weight.  He has no chest pain chest pressure shortness of breath edema orthopnea PND  EKG March 16, 2022 normal sinus rhythm within normal limits  Patient had blood tests at [facility] June 2022  total cholesterol 131 HDL 49 LDL 59 triglycerides 119 glucose 95   he is under a lot of stress at work but otherwise he offers no complaints.  As a result of the stress he screened about 5 to 6 pounds.  He is not taking Ozempic at the present time which may be helpful in his weight loss   visit November 4, 2022: Patient feels well without chest pain chest pressure shortness of breath. the patient has gained some weight since last visit and his hemoglobin A1c has increased to 6.2.  He has not been so careful with his diet.    Recent blood work dated 10/23/2022:  Hemoglobin A1c 6.2  normal liver function tests creatinine 1.99 BUN 22 potassium 4.9    EKG November 4, 2022: Normal sinus rhythm within normal limits no change  lipid panel cholesterol 152 HDL 58 triglycerides 153 LDL 63 cholesterol/HDL 3  WBC 11.56 hemoglobin 14.6 hematocrit 48.2  Visit February 29, 2023: Patient called on the weekend that he was experiencing some tightness in his chest.  It was not exertionally related.  It did not sound cardiac.  However he went to urgent care where an EKG which was sent to me was within normal limits.  He now returns for follow-up.  He is complaining of a variety of symptoms.  He continues to have some nonspecific discomfort in his chest diffuse achiness and again some sticking pain in his chest.  He has gained 20 pounds and has been off all his diabetic meds but has recently restarted after seeing his diabetologist.   visit  October 6, 2023: The patient's diabetes recently has not been under good control his medications have been reregulated he is now on Ozempic.  His hemoglobin A1c is apparently in the mid 7 range.  He continues to have a variety of nonspecific chest pain unrelated to exercise which he has had in the past.  He has no significant shortness of breath or palpitations   EKG October 6, 2023 is unchanged  Visit 2/20 2024: Apparently Ozempic has been stopped.  His hemoglobin A1c is 7.4.  He has not lost any significant weight and is not overly careful with salt in his diet.  He occasionally gets some atypical chest pain.  He remains on stable medication and now is on a combination medication Jardiance/metformin  EKG December 20, 2024 normal sinus rhythm left ventricular hypertrophy by voltage criteria no ST-T wave changes unchanged

## 2024-02-20 NOTE — ASSESSMENT
[FreeTextEntry1] : Gil Umaña 71 years old who I have  not seen for many years has hypertension well controlled hyperlipidemia well controlled and diabetes now well controlled.  He has a normal EKG and normal physical exam except for abdominal obesity.  He has made significant progress in losing weight and again his diabetes is, the better control with his hemoglobin A1c now 5.6.  recent 2D echo was unremarkable with normal LV and RV function and no valvular disease  He has no active angina, his EKG is normal.  His last catheterization in 2010 was normal with normal left ventricular function and no significant coronary disease  Presently he is functioning at a good level and tolerating his present regimen medications and his blood pressure is under good control. Presently he remains overweight and his blood pressure is not ideally controlled.  Otherwise his cardiac status is stable  1.  History of coronary artery disease no active issues, recent normal echo, no angina, normal EKG except for LVH by voltage criteria  2.  Hypertension not well-controlled today.  He has not been sleeping well remains overweight and is no longer on Ozempic.  He also has not been so careful with salt in his diet  3.  Elevated BMI  4.  Diabetes type 2 still not under ideal control but certainly reasonably well controlled being followed carefully by diabetology.  His diabetologist recently stopped his Ozempic and he would like another diabetologist  5.  Weight gain  Today the patient's blood pressure is not well-controlled otherwise his cardiac status is stable

## 2024-06-18 ENCOUNTER — APPOINTMENT (OUTPATIENT)
Dept: CARDIOLOGY | Facility: CLINIC | Age: 74
End: 2024-06-18

## 2024-06-25 ENCOUNTER — APPOINTMENT (OUTPATIENT)
Dept: CARDIOLOGY | Facility: CLINIC | Age: 74
End: 2024-06-25
Payer: MEDICARE

## 2024-06-25 VITALS
SYSTOLIC BLOOD PRESSURE: 150 MMHG | OXYGEN SATURATION: 97 % | DIASTOLIC BLOOD PRESSURE: 84 MMHG | WEIGHT: 252 LBS | BODY MASS INDEX: 38.32 KG/M2 | HEART RATE: 68 BPM

## 2024-06-25 DIAGNOSIS — R07.89 OTHER CHEST PAIN: ICD-10-CM

## 2024-06-25 DIAGNOSIS — E78.2 MIXED HYPERLIPIDEMIA: ICD-10-CM

## 2024-06-25 DIAGNOSIS — E11.9 TYPE 2 DIABETES MELLITUS W/OUT COMPLICATIONS: ICD-10-CM

## 2024-06-25 DIAGNOSIS — E65 LOCALIZED ADIPOSITY: ICD-10-CM

## 2024-06-25 DIAGNOSIS — I25.9 CHRONIC ISCHEMIC HEART DISEASE, UNSPECIFIED: ICD-10-CM

## 2024-06-25 PROCEDURE — G2211 COMPLEX E/M VISIT ADD ON: CPT

## 2024-06-25 PROCEDURE — 99214 OFFICE O/P EST MOD 30 MIN: CPT

## 2024-06-25 RX ORDER — METFORMIN HYDROCHLORIDE 1000 MG/1
1000 TABLET, COATED ORAL TWICE DAILY
Refills: 0 | Status: DISCONTINUED | COMMUNITY
End: 2024-06-25

## 2024-06-25 RX ORDER — INSULIN ASPART 100 [IU]/ML
100 INJECTION, SOLUTION INTRAVENOUS; SUBCUTANEOUS
Qty: 20 | Refills: 0 | Status: DISCONTINUED | COMMUNITY
Start: 2022-03-03 | End: 2024-06-25

## 2024-06-25 RX ORDER — METFORMIN HYDROCHLORIDE 1000 MG/1
1000 TABLET, FILM COATED, EXTENDED RELEASE ORAL
Qty: 60 | Refills: 0 | Status: DISCONTINUED | COMMUNITY
Start: 2022-02-11 | End: 2024-06-25

## 2024-06-25 RX ORDER — EMPAGLIFLOZIN 25 MG/1
25 TABLET, FILM COATED ORAL
Refills: 0 | Status: DISCONTINUED | COMMUNITY
End: 2024-06-25

## 2024-06-25 RX ORDER — SUB-Q INSULIN DEVICE, 40 UNIT
EACH MISCELLANEOUS
Qty: 90 | Refills: 0 | Status: DISCONTINUED | COMMUNITY
Start: 2022-01-25 | End: 2024-06-25

## 2024-06-25 RX ORDER — NYSTATIN 100000 1/G
100000 POWDER TOPICAL
Qty: 60 | Refills: 0 | Status: DISCONTINUED | COMMUNITY
Start: 2022-10-15 | End: 2024-06-25

## 2024-07-26 ENCOUNTER — APPOINTMENT (OUTPATIENT)
Dept: ULTRASOUND IMAGING | Facility: CLINIC | Age: 74
End: 2024-07-26
Payer: MEDICARE

## 2024-07-26 PROCEDURE — 76700 US EXAM ABDOM COMPLETE: CPT | Mod: 26

## 2024-12-03 ENCOUNTER — APPOINTMENT (OUTPATIENT)
Dept: CARDIOLOGY | Facility: CLINIC | Age: 74
End: 2024-12-03
Payer: MEDICARE

## 2024-12-03 VITALS
SYSTOLIC BLOOD PRESSURE: 160 MMHG | OXYGEN SATURATION: 98 % | HEIGHT: 68 IN | DIASTOLIC BLOOD PRESSURE: 88 MMHG | WEIGHT: 254 LBS | BODY MASS INDEX: 38.49 KG/M2 | HEART RATE: 69 BPM

## 2024-12-03 DIAGNOSIS — I25.9 CHRONIC ISCHEMIC HEART DISEASE, UNSPECIFIED: ICD-10-CM

## 2024-12-03 DIAGNOSIS — E78.2 MIXED HYPERLIPIDEMIA: ICD-10-CM

## 2024-12-03 DIAGNOSIS — R07.89 OTHER CHEST PAIN: ICD-10-CM

## 2024-12-03 PROCEDURE — G2211 COMPLEX E/M VISIT ADD ON: CPT

## 2024-12-03 PROCEDURE — 99214 OFFICE O/P EST MOD 30 MIN: CPT

## 2024-12-04 RX ORDER — SEMAGLUTIDE 1.34 MG/ML
2 INJECTION, SOLUTION SUBCUTANEOUS
Refills: 0 | Status: ACTIVE | COMMUNITY

## 2024-12-17 ENCOUNTER — APPOINTMENT (OUTPATIENT)
Dept: CARDIOLOGY | Facility: CLINIC | Age: 74
End: 2024-12-17

## 2025-03-04 ENCOUNTER — APPOINTMENT (OUTPATIENT)
Dept: CARDIOLOGY | Facility: CLINIC | Age: 75
End: 2025-03-04

## 2025-03-11 ENCOUNTER — APPOINTMENT (OUTPATIENT)
Dept: CARDIOLOGY | Facility: CLINIC | Age: 75
End: 2025-03-11
Payer: MEDICARE

## 2025-03-11 VITALS
SYSTOLIC BLOOD PRESSURE: 140 MMHG | OXYGEN SATURATION: 98 % | BODY MASS INDEX: 38.77 KG/M2 | WEIGHT: 255 LBS | HEART RATE: 68 BPM | DIASTOLIC BLOOD PRESSURE: 86 MMHG

## 2025-03-11 DIAGNOSIS — I25.9 CHRONIC ISCHEMIC HEART DISEASE, UNSPECIFIED: ICD-10-CM

## 2025-03-11 DIAGNOSIS — E11.9 TYPE 2 DIABETES MELLITUS W/OUT COMPLICATIONS: ICD-10-CM

## 2025-03-11 DIAGNOSIS — E78.2 MIXED HYPERLIPIDEMIA: ICD-10-CM

## 2025-03-11 DIAGNOSIS — R07.89 OTHER CHEST PAIN: ICD-10-CM

## 2025-03-11 DIAGNOSIS — E65 LOCALIZED ADIPOSITY: ICD-10-CM

## 2025-03-11 PROCEDURE — 99214 OFFICE O/P EST MOD 30 MIN: CPT

## 2025-03-11 PROCEDURE — G2211 COMPLEX E/M VISIT ADD ON: CPT

## 2025-06-25 ENCOUNTER — APPOINTMENT (OUTPATIENT)
Dept: CARDIOLOGY | Facility: CLINIC | Age: 75
End: 2025-06-25

## 2025-07-01 ENCOUNTER — APPOINTMENT (OUTPATIENT)
Dept: CARDIOLOGY | Facility: CLINIC | Age: 75
End: 2025-07-01
Payer: MEDICARE

## 2025-07-01 VITALS
BODY MASS INDEX: 38.62 KG/M2 | HEART RATE: 81 BPM | SYSTOLIC BLOOD PRESSURE: 140 MMHG | OXYGEN SATURATION: 97 % | DIASTOLIC BLOOD PRESSURE: 80 MMHG | WEIGHT: 254 LBS

## 2025-07-01 PROCEDURE — 99214 OFFICE O/P EST MOD 30 MIN: CPT

## 2025-07-01 PROCEDURE — G2211 COMPLEX E/M VISIT ADD ON: CPT

## 2025-07-01 PROCEDURE — 93000 ELECTROCARDIOGRAM COMPLETE: CPT

## 2025-07-08 ENCOUNTER — APPOINTMENT (OUTPATIENT)
Dept: CARDIOLOGY | Facility: CLINIC | Age: 75
End: 2025-07-08